# Patient Record
Sex: FEMALE | Race: WHITE | URBAN - METROPOLITAN AREA
[De-identification: names, ages, dates, MRNs, and addresses within clinical notes are randomized per-mention and may not be internally consistent; named-entity substitution may affect disease eponyms.]

---

## 2018-09-07 ENCOUNTER — OFFICE VISIT (OUTPATIENT)
Dept: FAMILY MEDICINE CLINIC | Age: 38
End: 2018-09-07

## 2018-09-07 VITALS
DIASTOLIC BLOOD PRESSURE: 77 MMHG | HEART RATE: 102 BPM | HEIGHT: 71 IN | WEIGHT: 141.6 LBS | TEMPERATURE: 98.6 F | SYSTOLIC BLOOD PRESSURE: 129 MMHG | BODY MASS INDEX: 19.82 KG/M2 | OXYGEN SATURATION: 100 % | RESPIRATION RATE: 18 BRPM

## 2018-09-07 DIAGNOSIS — Z76.89 ESTABLISHING CARE WITH NEW DOCTOR, ENCOUNTER FOR: Primary | ICD-10-CM

## 2018-09-07 DIAGNOSIS — Z13.1 SCREENING FOR DIABETES MELLITUS: ICD-10-CM

## 2018-09-07 DIAGNOSIS — F41.9 ANXIETY: ICD-10-CM

## 2018-09-07 DIAGNOSIS — Z13.9 ENCOUNTER FOR HEALTH-RELATED SCREENING: ICD-10-CM

## 2018-09-07 DIAGNOSIS — Z13.220 SCREENING FOR LIPID DISORDERS: ICD-10-CM

## 2018-09-07 DIAGNOSIS — Z86.39 HISTORY OF MULTINODULAR GOITER: ICD-10-CM

## 2018-09-07 RX ORDER — CITALOPRAM 20 MG/1
TABLET, FILM COATED ORAL
Qty: 30 TAB | Refills: 1 | Status: SHIPPED | OUTPATIENT
Start: 2018-09-07 | End: 2018-10-05 | Stop reason: SDUPTHER

## 2018-09-07 NOTE — PROGRESS NOTES
Chief Complaint Patient presents with [address] [place] [address] 1. Have you been to the ER, urgent care clinic since your last visit? Hospitalized since your last visit? No 
 
 
2. Have you seen or consulted any other health care providers outside of the [address] [place] [address] since your last visit? Include any pap smears or colon screening.  No

## 2018-09-07 NOTE — MR AVS SNAPSHOT
[address] 
[phone] Patient: Hannah Tomlin MRN: [id number] SLS:[id number] Visit Information Date & Time Provider Department Dept. Phone Encounter #  
 9/7/2018  8:30 AM Ladean Lundborg, [address] [id number] Upcoming Health Maintenance Date Due DTaP/Tdap/Td series (1 - Tdap) 6/18/2003 PAP AKA CERVICAL CYTOLOGY 6/18/2003 Influenza Age 5 to Adult 8/1/2018 Allergies as of 9/7/2018  Review Complete On: 9/7/2018 By: Iliana Ferrari LPN No Known Allergies Current Immunizations  Never Reviewed No immunizations on file. Not reviewed this visit You Were Diagnosed With   
  
 Codes Comments Establishing care with new doctor, encounter for    -  Primary ICD-10-CM: Z76.89 
ICD-9-CM: V65.8 Encounter for health-related screening     ICD-10-CM: Z13.9 ICD-9-CM: V82.9 Anxiety     ICD-10-CM: F41.9 ICD-9-CM: 300.00 Screening for lipid disorders     ICD-10-CM: Z13.220 ICD-9-CM: V77.91 Screening for diabetes mellitus     ICD-10-CM: Z13.1 ICD-9-CM: V77.1 History of multinodular goiter     ICD-10-CM: Z86.39 
ICD-9-CM: V12.29 Vitals BP Pulse Temp Resp Height(growth percentile) Weight(growth percentile) 129/77 (BP 1 Location: Left arm, BP Patient Position: Sitting) (!) 102 98.6 °F (37 °C) (Oral) 18 5' 10.5\" (1.791 m) 141 lb 9.6 oz (64.2 kg) SpO2 BMI Smoking Status 100% 20.03 kg/m2 Never Smoker Vitals History BMI and BSA Data Body Mass Index Body Surface Area 20.03 kg/m 2 1.79 m 2 Preferred Pharmacy Pharmacy Name Phone [id number] - [place], [address] [place] [address] [phone] Your Updated Medication List  
  
   
This list is accurate as of 9/7/18  9:10 AM.  Always use your most recent med list. ALTAVERA (28) 0.15-0.03 mg Tab Generic drug:  levonorgestrel-ethinyl estradiol  
  
 citalopram 20 mg tablet Commonly known as:  CELEXA  
10mg qdx2wk then 20mg qdthereafter  Indications: ANXIETY WITH DEPRESSION  
  
 fluticasone 50 mcg/actuation nasal spray Commonly known as:  FLONASE  
2 sprays each nostril once daily. Prescriptions Sent to Pharmacy Refills  
 citalopram (CELEXA) 20 mg tablet 1 Sig: 10mg qdx2wk then 20mg qdthereafter  Indications: ANXIETY WITH DEPRESSION Class: Normal  
 [facility] [id number] [address], [address] [facility] #: [phone] We Performed the Following CBC WITH AUTOMATED DIFF [59890 CPT(R)] LIPID PANEL [60624 CPT(R)] METABOLIC PANEL, COMPREHENSIVE [46353 CPT(R)] TSH 3RD GENERATION [69013 CPT(R)] Introducing [facility] SERVICES! Michael Márquez introduces [employer] patient portal. Now you can access parts of your medical record, email your doctor's office, and request medication refills online. 1. In your internet browser, go to [email or web address] 2. Click on the First Time User? Click Here link in the Sign In box. You will see the New Member Sign Up page. 3. Enter your [employer] Access Code exactly as it appears below. You will not need to use this code after youve completed the sign-up process. If you do not sign up before the expiration date, you must request a new code. · [employer] Access Code: [email or web address] Expires: 12/6/2018  9:10 AM 
 
4. Enter the last four digits of your Social Security Number (xxxx) and Date of Birth (mm/dd/yyyy) as indicated and click Submit. You will be taken to the next sign-up page. 5. Create a Aquaspyt ID. This will be your [employer]qua login ID and cannot be changed, so think of one that is secure and easy to remember. 6. Create a [facility]qua password. You can change your password at any time. 7. Enter your Password Reset Question and Answer.  This can be used at a later time if you forget your password. 8. Enter your e-mail address. You will receive e-mail notification when new information is available in [address] 9. Click Sign Up. You can now view and download portions of your medical record. 10. Click the Download Summary menu link to download a portable copy of your medical information. If you have questions, please visit the Frequently Asked Questions section of the [employer] website. Remember, [employer] is NOT to be used for urgent needs. For medical emergencies, dial 911. Now available from your iPhone and Android! Please provide this summary of care documentation to your next provider. Your primary care clinician is listed as Ladean Lundborg. If you have any questions after today's visit, please call [phone].

## 2018-09-07 NOTE — ASSESSMENT & PLAN NOTE
Encouraged to continue with therapy and decrease use of MJ. Recommend patient may trial celexa, start at low dose and titrate as ordered, f/u 1 month, call with questions

## 2018-09-07 NOTE — PROGRESS NOTES
Family Medicine Initial Office Visit Patient: Myrna Bejarano 1980, 41 y.o., female Encounter Date: 9/7/2018 ASSESSMENT & PLAN 
  ICD-10-CM ICD-9-CM 1. Establishing care with new doctor, encounter for Z76.89 V65.8 2. Encounter for health-related screening Z13.9 V82.9 3. Anxiety Z32.7 563.59 METABOLIC PANEL, COMPREHENSIVE  
   CBC WITH AUTOMATED DIFF  
   TSH 3RD GENERATION 4. Screening for lipid disorders Z13.220 V77.91 LIPID PANEL 5. Screening for diabetes mellitus T80.4 M24.7 METABOLIC PANEL, COMPREHENSIVE 6. History of multinodular goiter R29.34 T81.02 METABOLIC PANEL, COMPREHENSIVE  
   TSH 3RD GENERATION Orders Placed This Encounter  LIPID PANEL  
 METABOLIC PANEL, COMPREHENSIVE  
 CBC WITH AUTOMATED DIFF  
 TSH 3RD GENERATION  citalopram (CELEXA) 20 mg tablet Sig: 10mg qdx2wk then 20mg qdthereafter  Indications: ANXIETY WITH DEPRESSION Dispense:  30 Tab Refill:  1 Anxiety Encouraged to continue with therapy and decrease use of MJ. Recommend patient may trial celexa, start at low dose and titrate as ordered, f/u 1 month, call with questions Have gyn exam upcoming as scheduled Have flu shot through work before the end of October Will check baseline labs to ensure no underlying endocrine or metabolic abnormality Encouraged on supportive measures Pt reports safe at home CHIEF COMPLAINT Chief Complaint Patient presents with Chaim Omalley Establish Care SUBJECTIVE Myrna Bejarano is a 41 y.o. female presenting today for establishing care. 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 Patient reports she has some difficulties at home with her marriage, her  was diagnosed with PTSD from his [employer] time and she reports he also has Bipolar. She is following with family counseling too. She sometimes self-treats with mj but would like to stop. Patient works as [employer] Patient lives in a apt with  Patient was last seen by primary care 3-4 years ago Patient last saw a dentist 2 years ago Patient last had eye exam last year--wears glasses for distance sometimes. Has been following with gynecology. She has an upcoming appt for next month with Dr Bobo Mazariegos. Her mother had ovarian ca and her dad has pancreatic, she is hoping to have brca genetic testing and will follow with gyn for this. Follows a relatively healthy diet Exercises by walking and yoga--has a 3rd floor walk up. Review of Systems Constitutional: Negative for chills and fever. Eyes: Negative for visual disturbance. Respiratory: Negative for shortness of breath. Cardiovascular: Negative for chest pain and leg swelling. Gastrointestinal: Negative for constipation, diarrhea, nausea and vomiting. Endocrine: Negative for polydipsia, polyphagia and polyuria. Genitourinary: Negative for difficulty urinating. Musculoskeletal: Negative for arthralgias and myalgias. Skin: Negative for rash. Neurological: Negative for seizures, syncope and headaches. Psychiatric/Behavioral: Positive for dysphoric mood. The patient is nervous/anxious. All other systems reviewed and are negative. OBJECTIVE Visit Vitals  /77 (BP 1 Location: Left arm, BP Patient Position: Sitting)  Pulse (!) 102  Temp 98.6 °F (37 °C) (Oral)  Resp 18  Ht 5' 10.5\" (1.791 m)  Wt 141 lb 9.6 oz (64.2 kg)  SpO2 100%  BMI 20.03 kg/m2 Physical Exam  
Constitutional: She is oriented to person, place, and time. She appears well-developed and well-nourished. No distress. NAD, Nontoxic, Appears Stated Age HENT:  
Head: Normocephalic and atraumatic. Nose: Nose normal.  
Mouth/Throat: Oropharynx is clear and moist. No oropharyngeal exudate. Eyes: Conjunctivae and EOM are normal. Pupils are equal, round, and reactive to light. Right eye exhibits no discharge. Left eye exhibits no discharge. No scleral icterus. Neck: Normal range of motion. Neck supple. Thyromegaly (no dominant palpable nodules) present. Cardiovascular: Normal rate and regular rhythm. Exam reveals no gallop and no friction rub. No murmur heard. Pulmonary/Chest: Effort normal and breath sounds normal. No stridor. No respiratory distress. She has no wheezes. She has no rales. Abdominal: Soft. Bowel sounds are normal. She exhibits no distension. There is no tenderness. Musculoskeletal: She exhibits no edema or tenderness. Neurological: She is alert and oriented to person, place, and time. Grossly intact CN Skin: Skin is warm and dry. No rash noted. She is not diaphoretic. Psychiatric: She has a normal mood and affect. Her behavior is normal.  
Tearful when talking about relationship with  Nursing note and vitals reviewed. No results found for any visits on 09/07/18. HISTORICAL Reviewed and updated today, and as noted below: 
 
Past Medical History:  
Diagnosis Date  Headache HX of migraine headaches  Sinusitis History reviewed. No pertinent surgical history. Family History Problem Relation Age of Onset  Cancer Mother   
  ovarian  Cancer Father   
  pancreatic History Smoking Status  Never Smoker Smokeless Tobacco  
 Never Used Social History Social History  Marital status:  Spouse name: N/A  
 Number of children: N/A  
 Years of education: N/A Social History Main Topics  Smoking status: Never Smoker  Smokeless tobacco: Never Used  Alcohol use 0.5 oz/week 1 Standard drinks or equivalent per week  Drug use: No  
 Sexual activity: Yes  
  Partners: Male Other Topics Concern  None Social History Narrative Occupation: X ray technician No Known Allergies No results found for any previous visit. Azam Novoa MD 
[address] 09/07/18 8:47 AM 
 
 Portions of this note may have been populated using smart dictation software and may have \"sounds-like\" errors present.

## 2018-09-15 LAB
ALBUMIN SERPL-MCNC: 4.4 G/DL (ref 3.5–5.5)
ALBUMIN/GLOB SERPL: 1.6 {RATIO} (ref 1.2–2.2)
ALP SERPL-CCNC: 50 IU/L (ref 39–117)
ALT SERPL-CCNC: 13 IU/L (ref 0–32)
AST SERPL-CCNC: 15 IU/L (ref 0–40)
BASOPHILS # BLD AUTO: 0 X10E3/UL (ref 0–0.2)
BASOPHILS NFR BLD AUTO: 1 %
BILIRUB SERPL-MCNC: 0.3 MG/DL (ref 0–1.2)
BUN SERPL-MCNC: 9 MG/DL (ref 6–20)
BUN/CREAT SERPL: 11 (ref 9–23)
CALCIUM SERPL-MCNC: 9.2 MG/DL (ref 8.7–10.2)
CHLORIDE SERPL-SCNC: 103 MMOL/L (ref 96–106)
CHOLEST SERPL-MCNC: 162 MG/DL (ref 100–199)
CO2 SERPL-SCNC: 18 MMOL/L (ref 20–29)
CREAT SERPL-MCNC: 0.81 MG/DL (ref 0.57–1)
EOSINOPHIL # BLD AUTO: 0.2 X10E3/UL (ref 0–0.4)
EOSINOPHIL NFR BLD AUTO: 3 %
ERYTHROCYTE [DISTWIDTH] IN BLOOD BY AUTOMATED COUNT: 12.8 % (ref 12.3–15.4)
GLOBULIN SER CALC-MCNC: 2.8 G/DL (ref 1.5–4.5)
GLUCOSE SERPL-MCNC: 84 MG/DL (ref 65–99)
HCT VFR BLD AUTO: 39.8 % (ref 34–46.6)
HDLC SERPL-MCNC: 82 MG/DL
HGB BLD-MCNC: 12.9 G/DL (ref 11.1–15.9)
IMM GRANULOCYTES # BLD: 0 X10E3/UL (ref 0–0.1)
IMM GRANULOCYTES NFR BLD: 0 %
INTERPRETATION, 910389: NORMAL
LDLC SERPL CALC-MCNC: 70 MG/DL (ref 0–99)
LYMPHOCYTES # BLD AUTO: 2 X10E3/UL (ref 0.7–3.1)
LYMPHOCYTES NFR BLD AUTO: 33 %
MCH RBC QN AUTO: 30.3 PG (ref 26.6–33)
MCHC RBC AUTO-ENTMCNC: 32.4 G/DL (ref 31.5–35.7)
MCV RBC AUTO: 93 FL (ref 79–97)
MONOCYTES # BLD AUTO: 0.5 X10E3/UL (ref 0.1–0.9)
MONOCYTES NFR BLD AUTO: 9 %
NEUTROPHILS # BLD AUTO: 3.3 X10E3/UL (ref 1.4–7)
NEUTROPHILS NFR BLD AUTO: 54 %
PLATELET # BLD AUTO: 250 X10E3/UL (ref 150–379)
POTASSIUM SERPL-SCNC: 4.2 MMOL/L (ref 3.5–5.2)
PROT SERPL-MCNC: 7.2 G/DL (ref 6–8.5)
RBC # BLD AUTO: 4.26 X10E6/UL (ref 3.77–5.28)
SODIUM SERPL-SCNC: 142 MMOL/L (ref 134–144)
TRIGL SERPL-MCNC: 50 MG/DL (ref 0–149)
TSH SERPL DL<=0.005 MIU/L-ACNC: 1.77 UIU/ML (ref 0.45–4.5)
VLDLC SERPL CALC-MCNC: 10 MG/DL (ref 5–40)
WBC # BLD AUTO: 6 X10E3/UL (ref 3.4–10.8)

## 2018-09-18 ENCOUNTER — TELEPHONE (OUTPATIENT)
Dept: FAMILY MEDICINE CLINIC | Age: 38
End: 2018-09-18

## 2018-10-05 ENCOUNTER — OFFICE VISIT (OUTPATIENT)
Dept: FAMILY MEDICINE CLINIC | Age: 38
End: 2018-10-05

## 2018-10-05 VITALS
BODY MASS INDEX: 19.88 KG/M2 | HEIGHT: 71 IN | SYSTOLIC BLOOD PRESSURE: 148 MMHG | TEMPERATURE: 98.5 F | DIASTOLIC BLOOD PRESSURE: 81 MMHG | RESPIRATION RATE: 16 BRPM | HEART RATE: 90 BPM | WEIGHT: 142 LBS

## 2018-10-05 DIAGNOSIS — K59.03 DRUG-INDUCED CONSTIPATION: ICD-10-CM

## 2018-10-05 DIAGNOSIS — Z23 ENCOUNTER FOR IMMUNIZATION: ICD-10-CM

## 2018-10-05 DIAGNOSIS — F41.9 ANXIETY: Primary | ICD-10-CM

## 2018-10-05 RX ORDER — CITALOPRAM 20 MG/1
20 TABLET, FILM COATED ORAL DAILY
Qty: 90 TAB | Refills: 3 | Status: SHIPPED | OUTPATIENT
Start: 2018-10-05 | End: 2019-02-01 | Stop reason: SINTOL

## 2018-10-05 NOTE — PATIENT INSTRUCTIONS
For medication induced constipation the patient should increase her water consumption and continue to take in a high-fiber diet She may take Colace which is docusate and available over-the-counter, she buys the store brand and not any namebrand if not necessary If needing to add additional medication on top of this it is certainly safe to add senna which is also available over-the-counter, Colace and senna do come in a combo pill for convenience It is also okay to intermittently use MiraLAX as needed which is available over-the-counter and dissolvable in any clear cool liquid. Anxiety The patient is doing quite well at the 20 mg dose of Celexa, does not desire to increase this dose at this time. Encourage the patient to continue with medication as prescribed, refill provided, follow-up in 4 months. Sooner if needed, please call the office or contact us through my chart if needing another appointment.

## 2018-10-05 NOTE — PROGRESS NOTES
Family Medicine Follow-Up Progress Note Patient: Nicanor Veloz 1980, 41 y.o., female Encounter Date: 10/5/2018 ASSESSMENT & PLAN Anxiety The patient is doing quite well at the 20 mg dose of Celexa, does not desire to increase this dose at this time. Encourage the patient to continue with medication as prescribed, refill provided, follow-up in 4 months. Sooner if needed, please call the office or contact us through my chart if needing another appointment. Patient Instructions For medication induced constipation the patient should increase her water consumption and continue to take in a high-fiber diet She may take Colace which is docusate and available over-the-counter, she buys the store brand and not any namebrand if not necessary If needing to add additional medication on top of this it is certainly safe to add senna which is also available over-the-counter, Colace and senna do come in a combo pill for convenience It is also okay to intermittently use MiraLAX as needed which is available over-the-counter and dissolvable in any clear cool liquid. Anxiety The patient is doing quite well at the 20 mg dose of Celexa, does not desire to increase this dose at this time. Encourage the patient to continue with medication as prescribed, refill provided, follow-up in 4 months. Sooner if needed, please call the office or contact us through my chart if needing another appointment. 4m follow up Orders Placed This Encounter  Influenza virus vaccine (QUADRIVALENT PRES FREE SYRINGE) IM (97467)  citalopram (CELEXA) 20 mg tablet Sig: Take 1 Tab by mouth daily. Indications: ANXIETY WITH DEPRESSION Dispense:  90 Tab Refill:  3 ICD-10-CM ICD-9-CM 1. Anxiety F41.9 300.00 2. Drug-induced constipation K59.03 564.09   
  E980.5 3. Encounter for immunization Z23 V03.89 INFLUENZA VIRUS VAC QUAD,SPLIT,PRESV FREE SYRINGE IM  
 
 
CHIEF COMPLAINT Chief Complaint Patient presents with  Medication Evaluation SUBJECTIVE Chilo Giron is a 41 y.o. female presenting today for mood follow-up. Since starting the Celexa she has been doing very well. She notes that when she got to the 20 mg dose she felt her symptoms were beginning to get under control and now they feel very well controlled. Using no MJ over the past 2-3 weeks. Feels that her relationship is more stable with her , is very happy with the results. Weight neutral. 
No nausea or vomiting, mild constipation, but noticed that she got behind on fluids for a few days while she was traveling and thinks that may have contributed. Wondering if there is a medication that is safe to take every day in addition to the fiber gummy she is already taking. No fevers or chills, no headaches or muscle aches, no falling down or passing out. No SI, no HI, no AVH Review of Systems A 12 point review of systems was negative except as noted here or in the HPI. OBJECTIVE Visit Vitals  /81 (BP 1 Location: Left arm, BP Patient Position: Sitting)  Pulse 90  Temp 98.5 °F (36.9 °C) (Oral)  Resp 16  
 Ht 5' 10.5\" (1.791 m)  Wt 142 lb (64.4 kg)  LMP 09/27/2018  BMI 20.09 kg/m2 Physical Exam  
Constitutional: She is oriented to person, place, and time. She appears well-developed and well-nourished. No distress. NAD, Nontoxic, Appears Stated Age HENT:  
Head: Normocephalic and atraumatic. Nose: Nose normal.  
Mouth/Throat: Oropharynx is clear and moist. No oropharyngeal exudate. Eyes: Conjunctivae and EOM are normal. Pupils are equal, round, and reactive to light. Right eye exhibits no discharge. Left eye exhibits no discharge. No scleral icterus. Neck: Normal range of motion. Neck supple. Cardiovascular: Normal rate and regular rhythm. Exam reveals no gallop and no friction rub. No murmur heard. Pulmonary/Chest: Effort normal and breath sounds normal. No stridor.  No respiratory distress. She has no wheezes. She has no rales. Abdominal: Soft. Bowel sounds are normal. She exhibits no distension. There is no tenderness. Musculoskeletal: She exhibits no edema or tenderness. Neurological: She is alert and oriented to person, place, and time. Grossly intact CN Skin: Skin is warm and dry. No rash noted. She is not diaphoretic. Psychiatric: She has a normal mood and affect. Her behavior is normal.  
Today not tearful, improved disposition from last visit Nursing note and vitals reviewed. No results found for any visits on 10/05/18. HISTORICAL Reviewed and updated today, and as noted below: 
 
Past Medical History:  
Diagnosis Date  Headache HX of migraine headaches  Sinusitis History reviewed. No pertinent surgical history. Family History Problem Relation Age of Onset  Cancer Mother   
  ovarian  Cancer Father   
  pancreatic History Smoking Status  Never Smoker Smokeless Tobacco  
 Never Used Social History Social History  Marital status:  Spouse name: N/A  
 Number of children: N/A  
 Years of education: N/A Social History Main Topics  Smoking status: Never Smoker  Smokeless tobacco: Never Used  Alcohol use 0.5 oz/week 1 Standard drinks or equivalent per week  Drug use: No  
 Sexual activity: Yes  
  Partners: Male Other Topics Concern  None Social History Narrative Occupation: X ray technician No Known Allergies Office Visit on 09/07/2018 Component Date Value Ref Range Status  Cholesterol, total 09/14/2018 162  100 - 199 mg/dL Final  
 Triglyceride 09/14/2018 50  0 - 149 mg/dL Final  
 HDL Cholesterol 09/14/2018 82  >39 mg/dL Final  
 VLDL, calculated 09/14/2018 10  5 - 40 mg/dL Final  
 LDL, calculated 09/14/2018 70  0 - 99 mg/dL Final  
 Glucose 09/14/2018 84  65 - 99 mg/dL Final  
 BUN 09/14/2018 9  6 - 20 mg/dL Final  
  Creatinine 09/14/2018 0.81  0.57 - 1.00 mg/dL Final  
 GFR est non-AA 09/14/2018 94  >59 mL/min/1.73 Final  
 GFR est AA 09/14/2018 108  >59 mL/min/1.73 Final  
 BUN/Creatinine ratio 09/14/2018 11  9 - 23 Final  
 Sodium 09/14/2018 142  134 - 144 mmol/L Final  
 Potassium 09/14/2018 4.2  3.5 - 5.2 mmol/L Final  
 Chloride 09/14/2018 103  96 - 106 mmol/L Final  
 CO2 09/14/2018 18* 20 - 29 mmol/L Final  
 Calcium 09/14/2018 9.2  8.7 - 10.2 mg/dL Final  
 Protein, total 09/14/2018 7.2  6.0 - 8.5 g/dL Final  
 Albumin 09/14/2018 4.4  3.5 - 5.5 g/dL Final  
 GLOBULIN, TOTAL 09/14/2018 2.8  1.5 - 4.5 g/dL Final  
 A-G Ratio 09/14/2018 1.6  1.2 - 2.2 Final  
 Bilirubin, total 09/14/2018 0.3  0.0 - 1.2 mg/dL Final  
 Alk. phosphatase 09/14/2018 50  39 - 117 IU/L Final  
 AST (SGOT) 09/14/2018 15  0 - 40 IU/L Final  
 ALT (SGPT) 09/14/2018 13  0 - 32 IU/L Final  
 WBC 09/14/2018 6.0  3.4 - 10.8 x10E3/uL Final  
 RBC 09/14/2018 4.26  3.77 - 5.28 x10E6/uL Final  
 HGB 09/14/2018 12.9  11.1 - 15.9 g/dL Final  
 HCT 09/14/2018 39.8  34.0 - 46.6 % Final  
 MCV 09/14/2018 93  79 - 97 fL Final  
 MCH 09/14/2018 30.3  26.6 - 33.0 pg Final  
 MCHC 09/14/2018 32.4  31.5 - 35.7 g/dL Final  
 RDW 09/14/2018 12.8  12.3 - 15.4 % Final  
 PLATELET 04/20/9578 302  150 - 379 x10E3/uL Final  
 NEUTROPHILS 09/14/2018 54  Not Estab. % Final  
 Lymphocytes 09/14/2018 33  Not Estab. % Final  
 MONOCYTES 09/14/2018 9  Not Estab. % Final  
 EOSINOPHILS 09/14/2018 3  Not Estab. % Final  
 BASOPHILS 09/14/2018 1  Not Estab. % Final  
 ABS. NEUTROPHILS 09/14/2018 3.3  1.4 - 7.0 x10E3/uL Final  
 Abs Lymphocytes 09/14/2018 2.0  0.7 - 3.1 x10E3/uL Final  
 ABS. MONOCYTES 09/14/2018 0.5  0.1 - 0.9 x10E3/uL Final  
 ABS. EOSINOPHILS 09/14/2018 0.2  0.0 - 0.4 x10E3/uL Final  
 ABS.  BASOPHILS 09/14/2018 0.0  0.0 - 0.2 x10E3/uL Final  
 IMMATURE GRANULOCYTES 09/14/2018 0  Not Estab. % Final  
  ABS. IMM. GRANS. 09/14/2018 0.0  0.0 - 0.1 x10E3/uL Final  
 TSH 09/14/2018 1.770  0.450 - 4.500 uIU/mL Final  
 INTERPRETATION 09/14/2018 Note   Final  
 Supplemental report is available. Blake Bender MD 
[address] 10/05/18 8:43 AM 
 
Portions of this note may have been populated using smart dictation software and may have \"sounds-like\" errors present. Pt was counseled on risks, benefits and alternatives of treatment options. All questions were asked and answered and the patient was agreeable with the treatment plan as outlined.

## 2018-10-05 NOTE — MR AVS SNAPSHOT
[address] 
[phone] Patient: Iram Ybarra MRN: [id number] CMQ:8[birth year]51[id number] Visit Information Date & Time Provider Department Dept. Phone Encounter #  
 10/5/2018  8:30 AM Khushbu Sawyerkandi [id number] Follow-up Instructions Return in about 4 months (around 2/5/2019) for med check. Follow-up and Disposition History Your Appointments 2/1/2019  8:00 AM  
ROUTINE CARE with Ash Ford MD  
[address] [place] [address]) Appt Note: medication f/u  
 [address]  
[phone] [address] [place] [address] [address] [address] Upcoming Health Maintenance Date Due DTaP/Tdap/Td series (1 - Tdap) 6/18/2003 PAP AKA CERVICAL CYTOLOGY 6/18/2003 Influenza Age 5 to Adult 8/1/2018 Allergies as of 10/5/2018  Review Complete On: 10/5/2018 By: Ash Ford MD  
 No Known Allergies Current Immunizations  Never Reviewed Name Date Influenza Vaccine (Quad) PF 10/5/2018 Not reviewed this visit You Were Diagnosed With   
  
 Codes Comments Anxiety    -  Primary ICD-10-CM: F41.9 ICD-9-CM: 300.00 Drug-induced constipation     ICD-10-CM: K59.03 
ICD-9-CM: 564.09, E980.5 Encounter for immunization     ICD-10-CM: N31 ICD-9-CM: V03.89 Vitals BP Pulse Temp Resp Height(growth percentile) Weight(growth percentile) 148/81 (BP 1 Location: Left arm, BP Patient Position: Sitting) 90 98.5 °F (36.9 °C) (Oral) 16 5' 10.5\" (1.791 m) 142 lb (64.4 kg) LMP BMI Smoking Status 09/27/2018 20.09 kg/m2 Never Smoker Vitals History BMI and BSA Data Body Mass Index Body Surface Area 20.09 kg/m 2 1.79 m 2 Preferred Pharmacy Pharmacy Name Phone [facility] [id number] - [address], [address] [facility] [phone] Your Updated Medication List  
  
   
This list is accurate as of 10/5/18  8:55 AM.  Always use your most recent med list. ALTAVERA (28) 0.15-0.03 mg Tab Generic drug:  levonorgestrel-ethinyl estradiol  
  
 citalopram 20 mg tablet Commonly known as:  [place] Take 1 Tab by mouth daily. Indications: ANXIETY WITH DEPRESSION  
  
 fluticasone 50 mcg/actuation nasal spray Commonly known as:  FLONASE  
2 sprays each nostril once daily. Prescriptions Sent to Pharmacy Refills  
 citalopram (CELEXA) 20 mg tablet 3 Sig: Take 1 Tab by mouth daily. Indications: ANXIETY WITH DEPRESSION Class: Normal  
 [facility] [id number] [address], [address] Ph #: [phone] Route: Oral  
  
We Performed the Following INFLUENZA VIRUS VAC QUAD,SPLIT,PRESV FREE SYRINGE IM [id number] CPT(R)] Follow-up Instructions Return in about 4 months (around 2/5/2019) for med check. Patient Instructions For medication induced constipation the patient should increase her water consumption and continue to take in a high-fiber diet She may take Colace which is docusate and available over-the-counter, she buys the store brand and not any namebrand if not necessary If needing to add additional medication on top of this it is certainly safe to add senna which is also available over-the-counter, Colace and senna do come in a combo pill for convenience It is also okay to intermittently use MiraLAX as needed which is available over-the-counter and dissolvable in any clear cool liquid. Anxiety The patient is doing quite well at the 20 mg dose of Celexa, does not desire to increase this dose at this time. Encourage the patient to continue with medication as prescribed, refill provided, follow-up in 4 months.   Sooner if needed, please call the office or contact us through my chart if needing another appointment. Patient Instructions History Introducing [facility]! María Soria introduces [employer] patient portal. Now you can access parts of your medical record, email your doctor's office, and request medication refills online. 1. In your internet browser, go to [email or web address] 2. Click on the First Time User? Click Here link in the Sign In box. You will see the New Member Sign Up page. 3. Enter your [employer] Access Code exactly as it appears below. You will not need to use this code after youve completed the sign-up process. If you do not sign up before the expiration date, you must request a new code. · [employer] Access Code: [email or web address] Expires: 12/6/2018  9:10 AM 
 
4. Enter the last four digits of your Social Security Number (xxxx) and Date of Birth (mm/dd/yyyy) as indicated and click Submit. You will be taken to the next sign-up page. 5. Create a [employer] ID. This will be your [employer] login ID and cannot be changed, so think of one that is secure and easy to remember. 6. Create a [employer] password. You can change your password at any time. 7. Enter your Password Reset Question and Answer. This can be used at a later time if you forget your password. 8. Enter your e-mail address. You will receive e-mail notification when new information is available in [address] 9. Click Sign Up. You can now view and download portions of your medical record. 10. Click the Download Summary menu link to download a portable copy of your medical information. If you have questions, please visit the Frequently Asked Questions section of the [employer] website. Remember, [employer] is NOT to be used for urgent needs. For medical emergencies, dial 911. Now available from your iPhone and Android! Please provide this summary of care documentation to your next provider. Your primary care clinician is listed as Lizzie Gilbert. If you have any questions after today's visit, please call [phone].

## 2018-10-05 NOTE — ASSESSMENT & PLAN NOTE
The patient is doing quite well at the 20 mg dose of Celexa, does not desire to increase this dose at this time. Encourage the patient to continue with medication as prescribed, refill provided, follow-up in 4 months. Sooner if needed, please call the office or contact us through my chart if needing another appointment.

## 2018-10-05 NOTE — PROGRESS NOTES
Chief Complaint Patient presents with  Medication Evaluation 1. Have you been to the ER, urgent care clinic since your last visit? Hospitalized since your last visit? No 
 
 
2. Have you seen or consulted any other health care providers outside of the [address] [place] [address] since your last visit? Include any pap smears or colon screening. No 
 
 
Influenza Immunization/s administered on 10/05/18 by Sophia Zuniga LPN with patient's consent and per order of Dr. Mickey Castro M.D. Patient tolerated injection well, no complaints of pain before nor after the injection. No adverse effects noted after observing patient in office for 20 minutes.

## 2019-02-01 ENCOUNTER — OFFICE VISIT (OUTPATIENT)
Dept: FAMILY MEDICINE CLINIC | Age: 39
End: 2019-02-01

## 2019-02-01 VITALS
TEMPERATURE: 97.9 F | HEIGHT: 71 IN | HEART RATE: 72 BPM | DIASTOLIC BLOOD PRESSURE: 80 MMHG | WEIGHT: 140 LBS | RESPIRATION RATE: 18 BRPM | SYSTOLIC BLOOD PRESSURE: 133 MMHG | BODY MASS INDEX: 19.6 KG/M2

## 2019-02-01 DIAGNOSIS — F41.9 ANXIETY: Primary | ICD-10-CM

## 2019-02-01 NOTE — PROGRESS NOTES
Chief Complaint Patient presents with  Anxiety  
  follow up- pt states she is no longer taking the medication and is feeling OK  Labs  
  pt would like labs redrawn due to change in eating habits

## 2019-02-01 NOTE — PROGRESS NOTES
Family Medicine Follow-Up Progress Note Patient: Eloisa Hayes 1980, 41 y.o., female Encounter Date: 2/1/2019 ASSESSMENT & PLAN 
 
  ICD-10-CM ICD-9-CM 1. Anxiety F41.9 300.00 The patient is doing excellent from the standpoint of her anxiety, she is off of her medications and feeling well, she is not requiring any other supplements to help her manage her anxiety at this time If she needs us I would have her call us otherwise I recommend she follow-up with a doctor in a year, if she is in town I would like for her to see us otherwise if she is living elsewhere I would still recommend that she be seen at least annually Call with questions or concerns CHIEF COMPLAINT Chief Complaint Patient presents with  Anxiety  
  follow up- pt states she is no longer taking the medication and is feeling OK  Labs  
  pt would like labs redrawn due to change in eating habits SUBJECTIVE Eloisa Hayes is a 41 y.o. 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 Review of Systems Constitutional: Negative for chills and fever. Eyes: Negative for visual disturbance. Respiratory: Negative for shortness of breath. Cardiovascular: Negative for chest pain and leg swelling. Gastrointestinal: Negative for constipation, diarrhea, nausea and vomiting. Genitourinary: Negative for difficulty urinating. Musculoskeletal: Negative for arthralgias and myalgias. Neurological: Negative for seizures, syncope and headaches. Psychiatric/Behavioral: At Baseline, stable All other systems reviewed and are negative. OBJECTIVE Visit Vitals /80 Pulse 72 Temp 97.9 °F (36.6 °C) (Oral) Resp 18 Ht 5' 10.5\" (1.791 m) Wt 140 lb (63.5 kg) BMI 19.80 kg/m² Physical Exam  
Constitutional: She is oriented to person, place, and time. She appears well-developed and well-nourished. No distress. NAD, Nontoxic, Appears Stated Age HENT:  
Head: Normocephalic and atraumatic. Mouth/Throat: Oropharynx is clear and moist. No oropharyngeal exudate. Eyes: Conjunctivae and EOM are normal. Right eye exhibits no discharge. Left eye exhibits no discharge. No scleral icterus. Neck: Neck supple. No thyromegaly present. Cardiovascular: Normal rate, regular rhythm and normal heart sounds. Exam reveals no gallop and no friction rub. No murmur heard. Pulmonary/Chest: Effort normal and breath sounds normal. No stridor. No respiratory distress. She has no wheezes. She has no rales. Abdominal: Soft. Bowel sounds are normal. She exhibits no distension. There is no tenderness. There is no rebound and no guarding. Musculoskeletal: She exhibits no edema or tenderness. Lymphadenopathy:  
  She has no cervical adenopathy. Neurological: She is alert and oriented to person, place, and time. No cranial nerve deficit. Grossly intact CN Skin: Skin is warm and dry. No rash noted. She is not diaphoretic. Psychiatric: She has a normal mood and affect. Her behavior is normal.  
Nursing note and vitals reviewed. No results found for any visits on 02/01/19. HISTORICAL Reviewed and updated today, and as noted below: 
 
Past Medical History:  
Diagnosis Date  Headache HX of migraine headaches  Sinusitis History reviewed. No pertinent surgical history. Family History Problem Relation Age of Onset  Cancer Mother   
     ovarian  Cancer Father   
     pancreatic Social History Tobacco Use Smoking Status Never Smoker Smokeless Tobacco Never Used Social History Socioeconomic History  Marital status:  Spouse name: Not on file  Number of children: Not on file  Years of education: Not on file  Highest education level: Not on file Tobacco Use  Smoking status: Never Smoker  Smokeless tobacco: Never Used Substance and Sexual Activity  Alcohol use: Yes Alcohol/week: 0.5 oz Types: 1 Standard drinks or equivalent per week  Drug use: No  
 Sexual activity: Yes  
  Partners: Male Social History Narrative Occupation: X ray technician No Known Allergies No visits with results within 3 Month(s) from this visit. Latest known visit with results is:  
Office Visit on 09/07/2018 Component Date Value Ref Range Status  Cholesterol, total 09/14/2018 162  100 - 199 mg/dL Final  
 Triglyceride 09/14/2018 50  0 - 149 mg/dL Final  
 HDL Cholesterol 09/14/2018 82  >39 mg/dL Final  
 VLDL, calculated 09/14/2018 10  5 - 40 mg/dL Final  
 LDL, calculated 09/14/2018 70  0 - 99 mg/dL Final  
 Glucose 09/14/2018 84  65 - 99 mg/dL Final  
 BUN 09/14/2018 9  6 - 20 mg/dL Final  
 Creatinine 09/14/2018 0.81  0.57 - 1.00 mg/dL Final  
 GFR est non-AA 09/14/2018 94  >59 mL/min/1.73 Final  
 GFR est AA 09/14/2018 108  >59 mL/min/1.73 Final  
 BUN/Creatinine ratio 09/14/2018 11  9 - 23 Final  
 Sodium 09/14/2018 142  134 - 144 mmol/L Final  
 Potassium 09/14/2018 4.2  3.5 - 5.2 mmol/L Final  
 Chloride 09/14/2018 103  96 - 106 mmol/L Final  
 CO2 09/14/2018 18* 20 - 29 mmol/L Final  
 Calcium 09/14/2018 9.2  8.7 - 10.2 mg/dL Final  
 Protein, total 09/14/2018 7.2  6.0 - 8.5 g/dL Final  
 Albumin 09/14/2018 4.4  3.5 - 5.5 g/dL Final  
 GLOBULIN, TOTAL 09/14/2018 2.8  1.5 - 4.5 g/dL Final  
 A-G Ratio 09/14/2018 1.6  1.2 - 2.2 Final  
 Bilirubin, total 09/14/2018 0.3  0.0 - 1.2 mg/dL Final  
 Alk.  phosphatase 09/14/2018 50  39 - 117 IU/L Final  
 AST (SGOT) 09/14/2018 15  0 - 40 IU/L Final  
 ALT (SGPT) 09/14/2018 13  0 - 32 IU/L Final  
 WBC 09/14/2018 6.0  3.4 - 10.8 x10E3/uL Final  
 RBC 09/14/2018 4.26  3.77 - 5.28 x10E6/uL Final  
 HGB 09/14/2018 12.9  11.1 - 15.9 g/dL Final  
 HCT 09/14/2018 39.8  34.0 - 46.6 % Final  
 MCV 09/14/2018 93  79 - 97 fL Final  
 MCH 09/14/2018 30.3  26.6 - 33.0 pg Final  
  MCHC 09/14/2018 32.4  31.5 - 35.7 g/dL Final  
 RDW 09/14/2018 12.8  12.3 - 15.4 % Final  
 PLATELET 58/69/0399 063  150 - 379 x10E3/uL Final  
 NEUTROPHILS 09/14/2018 54  Not Estab. % Final  
 Lymphocytes 09/14/2018 33  Not Estab. % Final  
 MONOCYTES 09/14/2018 9  Not Estab. % Final  
 EOSINOPHILS 09/14/2018 3  Not Estab. % Final  
 BASOPHILS 09/14/2018 1  Not Estab. % Final  
 ABS. NEUTROPHILS 09/14/2018 3.3  1.4 - 7.0 x10E3/uL Final  
 Abs Lymphocytes 09/14/2018 2.0  0.7 - 3.1 x10E3/uL Final  
 ABS. MONOCYTES 09/14/2018 0.5  0.1 - 0.9 x10E3/uL Final  
 ABS. EOSINOPHILS 09/14/2018 0.2  0.0 - 0.4 x10E3/uL Final  
 ABS. BASOPHILS 09/14/2018 0.0  0.0 - 0.2 x10E3/uL Final  
 IMMATURE GRANULOCYTES 09/14/2018 0  Not Estab. % Final  
 ABS. IMM. GRANS. 09/14/2018 0.0  0.0 - 0.1 x10E3/uL Final  
 TSH 09/14/2018 1.770  0.450 - 4.500 uIU/mL Final  
 INTERPRETATION 09/14/2018 Note   Final  
 Supplemental report is available. Kodak Herring MD 
[address] 02/01/19 8:11 AM 
 
Portions of this note may have been populated using smart dictation software and may have \"sounds-like\" errors present. Pt was counseled on risks, benefits and alternatives of treatment options. All questions were asked and answered and the patient was agreeable with the treatment plan as outlined.

## 2019-02-01 NOTE — PATIENT INSTRUCTIONS
ICD-10-CM ICD-9-CM 1. Anxiety F41.9 300.00 The patient is doing excellent from the standpoint of her anxiety, she is off of her medications and feeling well, she is not requiring any other supplements to help her manage her anxiety at this time If she needs us I would have her call us otherwise I recommend she follow-up with a doctor in a year, if she is in town I would like for her to see us otherwise if she is living elsewhere I would still recommend that she be seen at least annually Call with questions or concerns

## 2022-03-18 PROBLEM — F41.9 ANXIETY: Status: ACTIVE | Noted: 2018-09-07

## 2023-05-17 RX ORDER — FLUTICASONE PROPIONATE 50 MCG
SPRAY, SUSPENSION (ML) NASAL
COMMUNITY
Start: 2015-03-04

## 2023-05-17 RX ORDER — LEVONORGESTREL AND ETHINYL ESTRADIOL 0.15-0.03
KIT ORAL
COMMUNITY
Start: 2015-03-02